# Patient Record
Sex: MALE | Race: WHITE | Employment: OTHER | URBAN - METROPOLITAN AREA
[De-identification: names, ages, dates, MRNs, and addresses within clinical notes are randomized per-mention and may not be internally consistent; named-entity substitution may affect disease eponyms.]

---

## 2019-09-23 ENCOUNTER — HOSPITAL ENCOUNTER (OUTPATIENT)
Dept: CT IMAGING | Age: 69
Discharge: HOME | End: 2019-09-23
Attending: INTERNAL MEDICINE
Payer: MEDICARE

## 2019-09-23 VITALS
HEART RATE: 60 BPM | WEIGHT: 185 LBS | OXYGEN SATURATION: 97 % | BODY MASS INDEX: 27.4 KG/M2 | SYSTOLIC BLOOD PRESSURE: 131 MMHG | DIASTOLIC BLOOD PRESSURE: 86 MMHG | RESPIRATION RATE: 18 BRPM | HEIGHT: 69 IN | TEMPERATURE: 98.1 F

## 2019-09-23 DIAGNOSIS — D58.2 OTHER HEMOGLOBINOPATHIES (HCC): ICD-10-CM

## 2019-09-23 DIAGNOSIS — D75.1 SECONDARY POLYCYTHEMIA: ICD-10-CM

## 2019-09-23 LAB
BASOPHILS # BLD: 0.1 K/UL (ref 0–0.1)
BASOPHILS NFR BLD: 1 % (ref 0–1)
DIFFERENTIAL METHOD BLD: ABNORMAL
EOSINOPHIL # BLD: 0.2 K/UL (ref 0–0.4)
EOSINOPHIL NFR BLD: 2 % (ref 0–7)
ERYTHROCYTE [DISTWIDTH] IN BLOOD BY AUTOMATED COUNT: 12.7 % (ref 11.5–14.5)
HCT VFR BLD AUTO: 52.4 % (ref 36.6–50.3)
HGB BLD-MCNC: 18.3 G/DL (ref 12.1–17)
IMM GRANULOCYTES # BLD AUTO: 0.1 K/UL (ref 0–0.04)
IMM GRANULOCYTES NFR BLD AUTO: 1 % (ref 0–0.5)
LYMPHOCYTES # BLD: 2.6 K/UL (ref 0.8–3.5)
LYMPHOCYTES NFR BLD: 32 % (ref 12–49)
MCH RBC QN AUTO: 33.2 PG (ref 26–34)
MCHC RBC AUTO-ENTMCNC: 34.9 G/DL (ref 30–36.5)
MCV RBC AUTO: 95.1 FL (ref 80–99)
MONOCYTES # BLD: 0.9 K/UL (ref 0–1)
MONOCYTES NFR BLD: 11 % (ref 5–13)
NEUTS SEG # BLD: 4.3 K/UL (ref 1.8–8)
NEUTS SEG NFR BLD: 53 % (ref 32–75)
NRBC # BLD: 0 K/UL (ref 0–0.01)
NRBC BLD-RTO: 0 PER 100 WBC
PLATELET # BLD AUTO: 167 K/UL (ref 150–400)
PMV BLD AUTO: 9.8 FL (ref 8.9–12.9)
RBC # BLD AUTO: 5.51 M/UL (ref 4.1–5.7)
WBC # BLD AUTO: 8 K/UL (ref 4.1–11.1)

## 2019-09-23 PROCEDURE — 85025 COMPLETE CBC W/AUTO DIFF WBC: CPT

## 2019-09-23 PROCEDURE — 77030018781

## 2019-09-23 PROCEDURE — 88313 SPECIAL STAINS GROUP 2: CPT

## 2019-09-23 PROCEDURE — 88184 FLOWCYTOMETRY/ TC 1 MARKER: CPT

## 2019-09-23 PROCEDURE — 74011250636 HC RX REV CODE- 250/636: Performed by: RADIOLOGY

## 2019-09-23 PROCEDURE — 38221 DX BONE MARROW BIOPSIES: CPT

## 2019-09-23 PROCEDURE — 77030003666 HC NDL SPINAL BD -A

## 2019-09-23 PROCEDURE — 88185 FLOWCYTOMETRY/TC ADD-ON: CPT

## 2019-09-23 PROCEDURE — 88311 DECALCIFY TISSUE: CPT

## 2019-09-23 PROCEDURE — 77030028872 HC BN BIOP NDL ON CNTRL TY TELE -C

## 2019-09-23 PROCEDURE — 77030014115

## 2019-09-23 PROCEDURE — 36415 COLL VENOUS BLD VENIPUNCTURE: CPT

## 2019-09-23 PROCEDURE — 88305 TISSUE EXAM BY PATHOLOGIST: CPT

## 2019-09-23 RX ORDER — MIDAZOLAM HYDROCHLORIDE 1 MG/ML
5 INJECTION, SOLUTION INTRAMUSCULAR; INTRAVENOUS
Status: DISCONTINUED | OUTPATIENT
Start: 2019-09-23 | End: 2019-09-27 | Stop reason: HOSPADM

## 2019-09-23 RX ORDER — SODIUM CHLORIDE 9 MG/ML
25 INJECTION, SOLUTION INTRAVENOUS CONTINUOUS
Status: DISCONTINUED | OUTPATIENT
Start: 2019-09-23 | End: 2019-09-23

## 2019-09-23 RX ORDER — HYDROXYZINE 25 MG/1
25 TABLET, FILM COATED ORAL
COMMUNITY

## 2019-09-23 RX ORDER — FENTANYL CITRATE 50 UG/ML
100 INJECTION, SOLUTION INTRAMUSCULAR; INTRAVENOUS
Status: DISCONTINUED | OUTPATIENT
Start: 2019-09-23 | End: 2019-09-23

## 2019-09-23 RX ADMIN — SODIUM CHLORIDE 25 ML/HR: 900 INJECTION, SOLUTION INTRAVENOUS at 08:57

## 2019-09-23 RX ADMIN — MIDAZOLAM 1 MG: 1 INJECTION INTRAMUSCULAR; INTRAVENOUS at 09:00

## 2019-09-23 RX ADMIN — FENTANYL CITRATE 50 MCG: 50 INJECTION, SOLUTION INTRAMUSCULAR; INTRAVENOUS at 09:00

## 2019-09-23 RX ADMIN — FENTANYL CITRATE 25 MCG: 50 INJECTION, SOLUTION INTRAMUSCULAR; INTRAVENOUS at 08:57

## 2019-09-23 RX ADMIN — MIDAZOLAM 2 MG: 1 INJECTION INTRAMUSCULAR; INTRAVENOUS at 08:57

## 2019-09-23 NOTE — ROUTINE PROCESS
Dr. Debby Baker into speak with pt and procedure explained along with risks and benefits; consent obtained for CT guided bone marrow biopsy.

## 2019-09-23 NOTE — H&P
Radiology History and Physical    Patient: Lisset Butler 78 y.o. male       Chief Complaint: No chief complaint on file. History of Present Illness: Hemoglobinopathy, secondary polycythemia.     History:    Past Medical History:   Diagnosis Date    Cancer ([facility] [address]     Skin    Gastric ulcer 11/6/2014    Gastrointestinal disorder     Diverticulosis    GERD (gastroesophageal reflux disease)     Hypertension     Ill-defined condition     Internal hemorrhoids    Personal history of colonic polyps 8/13/2015    Psychiatric disorder     anxiety     Family History   Problem Relation Age of Onset    Hypertension Sister     Diabetes Sister     Diabetes Brother     Hypertension Brother      Social History     Socioeconomic History    Marital status:      Spouse name: Not on file    Number of children: Not on file    Years of education: Not on file    Highest education level: Not on file   Occupational History    Not on file   Social Needs    Financial resource strain: Not on file    Food insecurity:     Worry: Not on file     Inability: Not on file    Transportation needs:     Medical: Not on file     Non-medical: Not on file   Tobacco Use    Smoking status: Never Smoker    Smokeless tobacco: Never Used   Substance and Sexual Activity    Alcohol use: No    Drug use: No    Sexual activity: Not on file   Lifestyle    Physical activity:     Days per week: Not on file     Minutes per session: Not on file    Stress: Not on file   Relationships    Social connections:     Talks on phone: Not on file     Gets together: Not on file     Attends Confucianism service: Not on file     Active member of club or organization: Not on file     Attends meetings of clubs or organizations: Not on file     Relationship status: Not on file    Intimate partner violence:     Fear of current or ex partner: Not on file     Emotionally abused: Not on file     Physically abused: Not on file     Forced sexual activity: Not on file   Other Topics Concern    Not on file   Social History Narrative    Not on file       Allergies: Allergies   Allergen Reactions    Clindamycin Nausea Only    Metronidazole Rash    Pcn [Penicillins] Rash    Tetracycline Rash       Current Medications:  Current Outpatient Medications   Medication Sig    hydrOXYzine HCl (ATARAX) 25 mg tablet Take 25 mg by mouth three (3) times daily as needed for Itching.  RABEprazole (ACIPHEX) 20 mg tablet Take 20 mg by mouth daily.  valsartan-hydrochlorothiazide (DIOVAN-HCT) 160-12.5 mg per tablet Take 1 Tab by mouth daily.  lovastatin (MEVACOR) 40 mg tablet Take 40 mg by mouth daily.  fluticasone (FLOVENT DISKUS) 50 mcg/actuation inhaler Take 1 Puff by inhalation.  aspirin 81 mg chewable tablet Take 81 mg by mouth daily.  fexofenadine (ALLEGRA) 180 mg tablet Take 180 mg by mouth daily.  BETA-CAROTENE,A, W-C & E/MIN (VISION PO) Take  by mouth.  hydrocortisone (PROCTOSOL HC) 2.5 % rectal cream Insert  into rectum as needed for Hemorrhoids. Current Facility-Administered Medications   Medication Dose Route Frequency    midazolam (VERSED) injection 5 mg  5 mg IntraVENous Multiple    0.9% sodium chloride infusion  25 mL/hr IntraVENous CONTINUOUS    fentaNYL citrate (PF) injection 100 mcg  100 mcg IntraVENous Multiple        Physical Exam:  Blood pressure 146/88, pulse 66, temperature 98.1 °F (36.7 °C), resp. rate 20, height 5' 9\" (1.753 m), weight 83.9 kg (185 lb), SpO2 96 %. GENERAL: alert, cooperative, no distress, appears stated age, LUNG: clear to auscultation bilaterally, HEART: regular rate and rhythm      Alerts:    Hospital Problems  Date Reviewed: 4/4/2016    None          Laboratory:      Recent Labs     09/23/19  0814   HGB 18.3*   HCT 52.4*   WBC 8.0            Plan of Care/Planned Procedure:  Risks, benefits, and alternatives reviewed with patient and he agrees to proceed with the procedure.        Martha Souza MD

## 2019-09-23 NOTE — DISCHARGE INSTRUCTIONS
[facility]  Radiology Department  [phone]    Radiologist: Dr. Verito Warren    Date: 09/23/2019         Bone Marrow Biopsy Discharge Instructions      Go home and rest  and restrict your activity the next 24 hours. You have been given sedating medications, so do not drive or drink alcohol today. Resume your previous diet and medications. You may shower in 24 hours. Do not soak or swim until the biopsy site has healed completely to minimize any risk of infection. Watch site for redness, drainage, pus, foul odor, increasing pain and fevers. Should this occur call you doctor immediatly. You may take Tylenol, as directed on the label, for pain or discomfort. Avoid Ibuprofen (Advil, Motrin etc.) and Aspirin today as they may increase your risk of bleeding. Be sure to follow up with your physician, and let him know how you are progressing. Your results will be sent to your physician as soon as they become available.       I

## 2019-09-23 NOTE — PROCEDURES
Interventional Radiology        9/23/2019    Informed consent obtained    Diagnosis: Other hemoglobinopathy, secondary polycythemia. Procedure(s): CT guided bone marrow aspirate and core biopsy. Specimens removed:  5 mL bone marrow aspirate and 2 x 1.2 cm core biopsy. Complications: None    Primary Physician: Joy Knapp MD    Recomendations: none    Discharge Disposition: Radiology recovery, then home if no complication.     Full dictated report to follow    Signed By: Joy Knapp MD

## 2021-10-20 RX ORDER — LORATADINE 10 MG/1
10 TABLET ORAL DAILY
COMMUNITY

## 2021-10-20 NOTE — PERIOP NOTES
[facility]  Ambulatory Surgery Unit  Pre-operative Instructions    Surgery/Procedure Date  10/29            Tentative Arrival Time tbd      1. On the day of your surgery/procedure, please report to the Ambulatory Surgery Unit Registration Desk and sign in at your designated time. The Ambulatory Surgery Unit is located in [facility] on the [facility] side of the [facility] across from the [address] [place] [address]. Please have all of your health insurance cards and a photo ID. 2. You must have someone with you to drive you home, as you should not drive a car for 24 hours following anesthesia. Please make arrangements for a responsible adult friend or family member to stay with you for at least the first 24 hours after your surgery. 3. Do not have anything to eat or drink (including water, gum, mints, coffee, juice) after 11:59 PM  10/28. This may not apply to medications prescribed by your physician. (Please note below the special instructions with medications to take the morning of surgery, if applicable.)    4. We recommend you do not drink any alcoholic beverages for 24 hours before and after your surgery. 5. Contact your surgeons office for instructions on the following medications: non-steroidal anti-inflammatory drugs (i.e. Advil, Aleve), vitamins, and supplements. (Some surgeons will want you to stop these medications prior to surgery and others may allow you to take them)   **If you are currently taking Plavix, Coumadin, Aspirin and/or other blood-thinning agents, contact your surgeon for instructions. ** Your surgeon will partner with the physician prescribing these medications to determine if it is safe to stop or if you need to continue taking. Please do not stop taking these medications without instructions from your surgeon.     6. In an effort to help prevent surgical site infection, we ask that you shower with an anti-bacterial soap (i.e. Dial/Safeguard, or the soap provided to you at your preadmission testing appointment) for 3 days prior to and on the morning of surgery, using a fresh towel after each shower. (Please begin this process with fresh bed linens.) Do not apply any lotions, powders, or deodorants after the shower on the day of your procedure. If applicable, please do not shave the operative site for 48 hours prior to surgery. 7. Wear comfortable clothes. Wear glasses instead of contacts. Do not bring any jewelry or money (other than copays or fees as instructed). Do not wear make-up, particularly mascara, the morning of your surgery. Do not wear nail polish, particularly if you are having foot /hand surgery. Wear your hair loose or down, no ponytails, buns, yi pins or clips. All body piercings must be removed. 8. You should understand that if you do not follow these instructions your surgery may be cancelled. If your physical condition changes (i.e. fever, cold or flu) please contact your surgeon as soon as possible. 9. It is important that you be on time. If a situation occurs where you may be late, or if you have any questions or problems, please call [phone].    10. Your surgery time may be subject to change. You will receive a phone call the day prior to surgery to confirm your arrival time. 11. Pediatric patients: please bring a change of clothes, diapers, bottle/sippy cup, pacifier, etc.      Special Instructions: Take all medications and inhalers, as prescribed, on the morning of surgery with a sip of water EXCEPT: none      Insulin Dependent Diabetic patients: Take your diabetic medications as prescribed the day before surgery. Hold all diabetic medications the day of surgery. If you are scheduled to arrive for surgery after 8:00 AM, and your AM blood sugar is >200, please call Ambulatory Surgery. I understand a pre-operative phone call will be made to verify my surgery time.   In the event that I am not available, I give permission for a message to be left on my answering service and/or with another person? yes      Reviewed by phone with pt, verbalized understanding.   covid testing 10/25 [id number]   ___________________      ___________________      ________________  (Signature of Patient)          (Witness)                   (Date and Time)

## 2021-10-25 ENCOUNTER — HOSPITAL ENCOUNTER (OUTPATIENT)
Dept: PREADMISSION TESTING | Age: 71
Discharge: HOME | End: 2021-10-25
Payer: MEDICARE

## 2021-10-25 PROCEDURE — U0005 INFEC AGEN DETEC AMPLI PROBE: HCPCS

## 2021-10-26 LAB
SARS-COV-2, XPLCVT: NOT DETECTED
SOURCE, COVRS: NORMAL

## 2021-10-28 ENCOUNTER — ANESTHESIA EVENT (OUTPATIENT)
Dept: SURGERY | Age: 71
End: 2021-10-28
Payer: MEDICARE

## 2021-10-29 ENCOUNTER — ANESTHESIA (OUTPATIENT)
Dept: SURGERY | Age: 71
End: 2021-10-29
Payer: MEDICARE

## 2021-10-29 ENCOUNTER — HOSPITAL ENCOUNTER (OUTPATIENT)
Age: 71
Setting detail: OUTPATIENT SURGERY
Discharge: HOME | End: 2021-10-29
Attending: SPECIALIST | Admitting: SPECIALIST
Payer: MEDICARE

## 2021-10-29 VITALS
HEIGHT: 68 IN | RESPIRATION RATE: 10 BRPM | HEART RATE: 60 BPM | SYSTOLIC BLOOD PRESSURE: 125 MMHG | TEMPERATURE: 97.8 F | OXYGEN SATURATION: 96 % | BODY MASS INDEX: 26.98 KG/M2 | WEIGHT: 178 LBS | DIASTOLIC BLOOD PRESSURE: 82 MMHG

## 2021-10-29 PROCEDURE — 74011000250 HC RX REV CODE- 250: Performed by: NURSE ANESTHETIST, CERTIFIED REGISTERED

## 2021-10-29 PROCEDURE — 74011250637 HC RX REV CODE- 250/637: Performed by: SPECIALIST

## 2021-10-29 PROCEDURE — 77030021352 HC CBL LD SYS DISP COVD -B: Performed by: SPECIALIST

## 2021-10-29 PROCEDURE — 76210000050 HC AMBSU PH II REC 0.5 TO 1 HR: Performed by: SPECIALIST

## 2021-10-29 PROCEDURE — 2709999900 HC NON-CHARGEABLE SUPPLY: Performed by: SPECIALIST

## 2021-10-29 PROCEDURE — 74011250636 HC RX REV CODE- 250/636: Performed by: ANESTHESIOLOGY

## 2021-10-29 PROCEDURE — 76060000073 HC AMB SURG ANES FIRST 0.5 HR: Performed by: SPECIALIST

## 2021-10-29 PROCEDURE — 76030000002 HC AMB SURG OR TIME FIRST 0.: Performed by: SPECIALIST

## 2021-10-29 PROCEDURE — 74011250636 HC RX REV CODE- 250/636: Performed by: NURSE ANESTHETIST, CERTIFIED REGISTERED

## 2021-10-29 RX ORDER — SODIUM CHLORIDE 0.9 % (FLUSH) 0.9 %
5-40 SYRINGE (ML) INJECTION EVERY 8 HOURS
Status: DISCONTINUED | OUTPATIENT
Start: 2021-10-29 | End: 2021-10-29 | Stop reason: HOSPADM

## 2021-10-29 RX ORDER — PHENOL/SODIUM PHENOLATE
20 AEROSOL, SPRAY (ML) MUCOUS MEMBRANE DAILY
COMMUNITY

## 2021-10-29 RX ORDER — SODIUM CHLORIDE 0.9 % (FLUSH) 0.9 %
5-40 SYRINGE (ML) INJECTION AS NEEDED
Status: DISCONTINUED | OUTPATIENT
Start: 2021-10-29 | End: 2021-10-29 | Stop reason: HOSPADM

## 2021-10-29 RX ORDER — ONDANSETRON 2 MG/ML
4 INJECTION INTRAMUSCULAR; INTRAVENOUS AS NEEDED
Status: DISCONTINUED | OUTPATIENT
Start: 2021-10-29 | End: 2021-10-29 | Stop reason: HOSPADM

## 2021-10-29 RX ORDER — DIPHENHYDRAMINE HYDROCHLORIDE 50 MG/ML
12.5 INJECTION, SOLUTION INTRAMUSCULAR; INTRAVENOUS AS NEEDED
Status: DISCONTINUED | OUTPATIENT
Start: 2021-10-29 | End: 2021-10-29 | Stop reason: HOSPADM

## 2021-10-29 RX ORDER — SODIUM CHLORIDE, SODIUM LACTATE, POTASSIUM CHLORIDE, CALCIUM CHLORIDE 600; 310; 30; 20 MG/100ML; MG/100ML; MG/100ML; MG/100ML
25 INJECTION, SOLUTION INTRAVENOUS CONTINUOUS
Status: DISCONTINUED | OUTPATIENT
Start: 2021-10-29 | End: 2021-10-29 | Stop reason: HOSPADM

## 2021-10-29 RX ORDER — PROPOFOL 10 MG/ML
INJECTION, EMULSION INTRAVENOUS AS NEEDED
Status: DISCONTINUED | OUTPATIENT
Start: 2021-10-29 | End: 2021-10-29 | Stop reason: HOSPADM

## 2021-10-29 RX ORDER — LORAZEPAM 1 MG/1
0.5 TABLET ORAL
COMMUNITY

## 2021-10-29 RX ORDER — LIDOCAINE HYDROCHLORIDE 20 MG/ML
INJECTION, SOLUTION EPIDURAL; INFILTRATION; INTRACAUDAL; PERINEURAL AS NEEDED
Status: DISCONTINUED | OUTPATIENT
Start: 2021-10-29 | End: 2021-10-29 | Stop reason: HOSPADM

## 2021-10-29 RX ORDER — FENTANYL CITRATE 50 UG/ML
25 INJECTION, SOLUTION INTRAMUSCULAR; INTRAVENOUS
Status: DISCONTINUED | OUTPATIENT
Start: 2021-10-29 | End: 2021-10-29 | Stop reason: HOSPADM

## 2021-10-29 RX ORDER — DEXTROMETHORPHAN/PSEUDOEPHED 2.5-7.5/.8
DROPS ORAL AS NEEDED
Status: DISCONTINUED | OUTPATIENT
Start: 2021-10-29 | End: 2021-10-29 | Stop reason: HOSPADM

## 2021-10-29 RX ORDER — LIDOCAINE HYDROCHLORIDE 10 MG/ML
0.1 INJECTION, SOLUTION EPIDURAL; INFILTRATION; INTRACAUDAL; PERINEURAL AS NEEDED
Status: DISCONTINUED | OUTPATIENT
Start: 2021-10-29 | End: 2021-10-29 | Stop reason: HOSPADM

## 2021-10-29 RX ADMIN — PROPOFOL 50 MG: 10 INJECTION, EMULSION INTRAVENOUS at 07:32

## 2021-10-29 RX ADMIN — PROPOFOL 50 MG: 10 INJECTION, EMULSION INTRAVENOUS at 07:39

## 2021-10-29 RX ADMIN — LIDOCAINE HYDROCHLORIDE 40 MG: 20 INJECTION, SOLUTION EPIDURAL; INFILTRATION; INTRACAUDAL; PERINEURAL at 07:32

## 2021-10-29 RX ADMIN — PROPOFOL 20 MG: 10 INJECTION, EMULSION INTRAVENOUS at 07:42

## 2021-10-29 RX ADMIN — SODIUM CHLORIDE, POTASSIUM CHLORIDE, SODIUM LACTATE AND CALCIUM CHLORIDE 25 ML/HR: 600; 310; 30; 20 INJECTION, SOLUTION INTRAVENOUS at 06:41

## 2021-10-29 RX ADMIN — PROPOFOL 50 MG: 10 INJECTION, EMULSION INTRAVENOUS at 07:35

## 2021-10-29 NOTE — PERIOP NOTES
Pt tolerating liquids, vss.    Dc instructions reviewed with pt and wife, all questions answered. Reviewed when to call the doctor, diet, and activity. Pt denies any pain. 0830-Transported via w/c to awaiting transportation.

## 2021-10-29 NOTE — PERIOP NOTES
Permission received to review discharge instructions and discuss private health information with wife, Khai stein. Patient states that wife will be with them for at least 24 hours following today's procedure.

## 2021-10-29 NOTE — H&P
Pre-endoscopy H and P    The patient was seen and examined in the [facility] pre op. The airway was assessed and docuemented. The problem list, past medical history, and medications were reviewed. Patient Active Problem List   Diagnosis Code    GIB (gastrointestinal bleeding) K92.2    Gastric ulcer K25.9    History of colon polyps Z86.010    Left inguinal hernia K40.90     Social History     Socioeconomic History    Marital status:      Spouse name: Not on file    Number of children: Not on file    Years of education: Not on file    Highest education level: Not on file   Occupational History    Not on file   Tobacco Use    Smoking status: Never Smoker    Smokeless tobacco: Never Used   Substance and Sexual Activity    Alcohol use: No    Drug use: No    Sexual activity: Not on file   Other Topics Concern    Not on file   Social History Narrative    Not on file     Social Determinants of Health     Financial Resource Strain:     Difficulty of Paying Living Expenses:    Food Insecurity:     Worried About Running Out of Food in the Last Year:     Ran Out of Food in the Last Year:    Transportation Needs:     Lack of Transportation (Medical):      Lack of Transportation (Non-Medical):    Physical Activity:     Days of Exercise per Week:     Minutes of Exercise per Session:    Stress:     Feeling of Stress :    Social Connections:     Frequency of Communication with Friends and Family:     Frequency of Social Gatherings with Friends and Family:     Attends Taoist Services:     Active Member of Clubs or Organizations:     Attends Club or Organization Meetings:     Marital Status:    Intimate Partner Violence:     Fear of Current or Ex-Partner:     Emotionally Abused:     Physically Abused:     Sexually Abused:      Past Medical History:   Diagnosis Date    Gastric ulcer 11/6/2014    Gastrointestinal disorder     Diverticulosis    GERD (gastroesophageal reflux disease)     Hypertension     Ill-defined condition     Internal hemorrhoids    Nausea & vomiting     Personal history of colonic polyps 8/13/2015    Polycythemia     Dr. Jessica Ramirez monitoring. no treatment.  Psychiatric disorder     anxiety    Skin cancer      The patient has a family history of na    Prior to Admission Medications   Prescriptions Last Dose Informant Patient Reported? Taking? BETA-CAROTENE,A, W-C & E/MIN (VISION PO) 10/28/2021 at Unknown time  Yes Yes   Sig: Take  by mouth. LORazepam (Ativan) 1 mg tablet 10/29/2021 at Unknown time  Yes Yes   Sig: Take 0.5 mg by mouth every four (4) hours as needed for Anxiety. Omeprazole delayed release (PRILOSEC D/R) 20 mg tablet 10/28/2021 at Unknown time  Yes Yes   Sig: Take 20 mg by mouth daily. RABEprazole (ACIPHEX) 20 mg tablet Not Taking at Unknown time  Yes No   Sig: Take 20 mg by mouth daily. Patient not taking: Reported on 10/29/2021   hydrOXYzine HCl (ATARAX) 25 mg tablet Unknown at Unknown time  Yes No   Sig: Take 25 mg by mouth three (3) times daily as needed for Itching.   hydrocortisone (PROCTOSOL HC) 2.5 % rectal cream Not Taking at Unknown time  Yes No   Sig: Insert  into rectum as needed for Hemorrhoids. Patient not taking: Reported on 10/20/2021   loratadine (Claritin) 10 mg tablet 10/28/2021 at Unknown time  Yes Yes   Sig: Take 10 mg by mouth daily. lovastatin (MEVACOR) 40 mg tablet 10/29/2021 at Unknown time  Yes Yes   Sig: Take 40 mg by mouth daily. valsartan-hydrochlorothiazide (DIOVAN-HCT) 160-12.5 mg per tablet 10/29/2021 at Unknown time  Yes Yes   Sig: Take 1 Tab by mouth daily. Facility-Administered Medications: None           The review of systems is:  negative for shortness of breath or chest pain      The heart, lungs, and mental status were satisfactory for the administration of anesthesia sedation and for the procedure.       I discussed with the patient the objectives, risks, consequences and alternatives to the procedure. The patient was counseled at length about the risks of cheryl Covid-19 during their perioperative period and any recovery window from their procedure. The patient was made aware that cheryl Covid-19  may worsen their prognosis for recovering from their procedure and lend to a higher morbidity and/or mortality risk. All material risks, benefits, and reasonable alternatives including postponing the procedure were discussed. The patient does  wish to proceed with the procedure at this time.         Isabel Elizabeth MD  10/29/2021  7:06 AM

## 2021-10-29 NOTE — PERIOP NOTES
Priyank Garg  1950  [id number]    Situation:  Verbal report given from: OTIS Moya CRNA, RN  Procedure: Procedure(s):  COLONOSCOPY    Background:    Preoperative diagnosis: HX COLON POLYPS    Postoperative diagnosis: Diverticulosis, internal hemorrhoid    :  Dr. Chiqui Junior    Assistant(s): Circ-1: Lonnie Ochoa  Circ-2: Wally Quintana RN  Scrub Tech-1: Gemma Pacheco    Specimens: * No specimens in log *    Assessment:  Intra-procedure medications         Anesthesia gave intra-procedure sedation and medications, see anesthesia flow sheet     Intravenous fluids: LR@ KVO     Vital signs stable       Recommendation:

## 2021-10-29 NOTE — DISCHARGE INSTRUCTIONS
Charanjit Stager  [id number]  1950              Procedure  Discharge Instructions:      Discomfort:  Redness at IV site- apply cold compress to area; if redness or soreness persist- contact your physician  There may be a slight amount of blood passed from the rectum  Gaseous discomfort- walking, belching will help relieve any discomfort  You may not operate a vehicle for 12 hours  You may not engage in an occupation involving machinery or appliances for rest of today  You may not drink alcoholic beverages for at least 12 hours  Avoid making any critical decisions for at least 24 hour  DIET:   You may resume your normal diet today. You should not overeat or \"feast\" today as your abdomen may become distended or uncomfortable. MEDICATIONS:   I reconciled this list from the list you gave us when you came today for the procedure. Please clarify with me, your primary care physician and the nurse who is discharging you if we have any discrepancies. Aspirin and or non-steroidal medication (Ibuprofen, Motrin, naproxen, etc.) is ok in limited quantities. ACTIVITY:  You may resume your normal daily activities it is recommended that you spend the remainder of the day resting -  avoid any strenuous activity. CALL M.D. ANY SIGN OF:  Increasing pain, nausea, vomiting  Abdominal distension (swelling)  New increased bleeding (oral or rectal)  Fever (chills)  Pain in chest area  Bloody discharge from nose or mouth  Shortness of breath          Follow-up Instructions:   No biopsies  Telephone #  [phone]  Follow up visit as needed. You and your pcp can determine if you need another colonoscopy in ten years. You only need omeprazole or rabeprazole, not both     Isabel Elizabeth MD  7:56 AM  10/29/2021           DO NOT TAKE SLEEPING MEDICATIONS OR ANTIANXIETY MEDICATIONS WHILE TAKING NARCOTIC PAIN MEDICATIONS,  ESPECIALLY THE NIGHT OF ANESTHESIA.     CPAP PATIENTS BE SURE TO WEAR MACHINE WHENEVER NAPPING OR SLEEPING. DISCHARGE SUMMARY from Nurse    The following personal items collected during your admission are returned to you:   Dental Appliance: Dental Appliances: None  Vision: Visual Aid: Glasses (Glasses given to wife)  Hearing Aid:    Jewelry:    Clothing:    Other Valuables:    Valuables sent to safe:        PATIENT INSTRUCTIONS:    Anesthesia Discharge Instructions for Procedural Area requiring Sedation (MAC Anesthesia, Cath Lab, Endo and Radiology): You have been given medications during your procedure that may affect your memory and mental judgement for the next 24 hours. During this time frame for your safety, please follow the instructions listed below :    Have a responsible adult to drive you home and be with you for at least 12 hours.  Rest today and resume normal activities tomorrow.  Start with a soft bland diet and advance as tolerated to your recommended diet.  Do not drive any motor vehicle or operate mechanical or electrical equipment prior to [employer].  Avoid making critical decisions or signing legal documents prior to 6am tomorrow.  Do not drink alcohol prior to 6am tomorrow.  If you have sleep apnea and you plan to go home and take a nap, please use your CPAP machine not only at bedtime, but also while napping for 24 hours.  If you notice any redness or swelling on parts of your body where IV medications were given, place a warm wet washcloth over the area for 20 minutes at a time until the redness or swelling goes away. If you still have redness or swelling after 2-3 days, please call us. · You will receive a Post Operative Call from one of the Recovery Room Nurses on the day after your surgery to check on you. It is very important for us to know how you are recovering after your surgery. If you have an issue or need to speak with someone, please call your surgeon, do not wait for the post operative call.     · You may receive an e-mail or letter in the mail from [employer] regarding your experience with us in the Ambulatory Surgery Unit. Your feedback is valuable to us and we appreciate your participation in the survey. · We wish you a speedy recovery ? What to do at Home:      *  Please give a list of your current medications to your Primary Care Provider. *  Please update this list whenever your medications are discontinued, doses are      changed, or new medications (including over-the-counter products) are added. *  Please carry medication information at all times in case of emergency situations. If you have not received your influenza and/or pneumococcal vaccine, please follow up with your primary care physician. The discharge information has been reviewed with the patient and caregiver. The patient and caregiver verbalized understanding.

## 2021-10-29 NOTE — ANESTHESIA PREPROCEDURE EVALUATION
Anesthetic History     PONV          Review of Systems / Medical History  Patient summary reviewed, nursing notes reviewed and pertinent labs reviewed    Pulmonary  Within defined limits                 Neuro/Psych   Within defined limits      Psychiatric history     Cardiovascular    Hypertension              Exercise tolerance: >4 METS     GI/Hepatic/Renal     GERD      PUD    Comments: Diverticulosis  H/o colon polyps Endo/Other        Cancer     Other Findings   Comments:     Polycythemia; monitored by Hematologist                Physical Exam    Airway  Mallampati: III  TM Distance: 4 - 6 cm  Neck ROM: normal range of motion   Mouth opening: Normal     Cardiovascular    Rhythm: regular  Rate: normal         Dental  No notable dental hx       Pulmonary  Breath sounds clear to auscultation               Abdominal  GI exam deferred       Other Findings            Anesthetic Plan    ASA: 2  Anesthesia type: general and total IV anesthesia          Induction: Intravenous  Anesthetic plan and risks discussed with: Patient      PONV prophylaxis

## 2021-10-29 NOTE — ANESTHESIA POSTPROCEDURE EVALUATION
Procedure(s):  COLONOSCOPY. general, total IV anesthesia    Anesthesia Post Evaluation      Multimodal analgesia: multimodal analgesia not used between 6 hours prior to anesthesia start to PACU discharge  Patient location during evaluation: PACU  Patient participation: complete - patient participated  Level of consciousness: awake and alert  Pain score: 0  Airway patency: patent  Anesthetic complications: no  Cardiovascular status: acceptable  Respiratory status: acceptable  Hydration status: acceptable  Post anesthesia nausea and vomiting:  none  Final Post Anesthesia Temperature Assessment:  Normothermia (36.0-37.5 degrees C)      INITIAL Post-op Vital signs:   Vitals Value Taken Time   /82 10/29/21 0814   Temp 36.6 °C (97.8 °F) 10/29/21 0808   Pulse 61 10/29/21 0816   Resp 22 10/29/21 0816   SpO2 95 % 10/29/21 0816   Vitals shown include unvalidated device data.

## 2021-10-29 NOTE — PROCEDURES
Colonoscopy    Indications: history colon polyps    Pre-operative Diagnosis: history colon polyps    Assistant(s):  see chart   Rikc Varela RN  [facility]    Medications:  See anesthesia form    Post-operative Diagnosis:  Diverticulosis, internal hemorrhoid            Procedure Details   Prior to the procedure its objectives, risks, consequences and alternatives were discussed with the patient who then elected to proceed. All questions were answered. Digital Rectal Exam:  was normal     The Olympus videocolonoscope was inserted in the rectum and advanced to the cecum. The cecum was identified by typical landmarks. The colonoscope was slowly and carefully withdrawn as the mucosa was inspected. Left sided diverticula were seen. No other abnormalities were noted. Retroflexion in the rectum was remarkable for internal hemorrhoids. Photos to document the ileocecal valve, appendiceal orifice and retroflexion exam were obtained. The preparation was adequate      Estimated Blood Loss:  none    Specimens:  none    Findings:  Negative exam except internal hemorrhoids and diverticula    Complications:  none    Implants:  none    Repeat colonoscopy is recommended in:  (PCP and patient to consider in ten years.                Shanda Charles MD  7:49 AM  10/29/2021

## 2023-05-22 RX ORDER — VALSARTAN AND HYDROCHLOROTHIAZIDE 160; 12.5 MG/1; MG/1
1 TABLET, FILM COATED ORAL DAILY
COMMUNITY

## 2023-05-22 RX ORDER — RABEPRAZOLE SODIUM 20 MG/1
20 TABLET, DELAYED RELEASE ORAL DAILY
COMMUNITY

## 2023-05-22 RX ORDER — HYDROXYZINE HYDROCHLORIDE 25 MG/1
25 TABLET, FILM COATED ORAL 3 TIMES DAILY PRN
COMMUNITY

## 2023-05-22 RX ORDER — LORATADINE 10 MG/1
10 TABLET ORAL DAILY
COMMUNITY

## 2023-05-22 RX ORDER — LOVASTATIN 40 MG/1
40 TABLET ORAL DAILY
COMMUNITY

## 2023-05-22 RX ORDER — LORAZEPAM 1 MG/1
0.5 TABLET ORAL EVERY 4 HOURS PRN
COMMUNITY

## 2023-05-22 RX ORDER — OMEPRAZOLE 20 MG/1
20 TABLET, DELAYED RELEASE ORAL DAILY
COMMUNITY

## (undated) DEVICE — KIT,1200CC CANISTER,3/16"X6' TUBING: Brand: MEDLINE INDUSTRIES, INC.

## (undated) DEVICE — BASIN EMSIS 16OZ GRAPHITE PLAS KID SHP MOLD GRAD FOR ORAL

## (undated) DEVICE — CONTINU-FLO SOLUTION SET, 2 INJECTION SITES, MALE LUER LOCK ADAPTER WITH RETRACTABLE COLLAR, LARGE BORE STOPCOCK WITH ROTATING MALE LUER LOCK EXTENSION SET, 2 INJECTION SITES, MALE LUER LOCK ADAPTER WITH RETRACTABLE COLLAR: Brand: INTERLINK/CONTINU-FLO

## (undated) DEVICE — ENDO CARRY-ON PROCEDURE KIT INCLUDES ENZYMATIC SPONGE, GAUZE, BIOHAZARD LABEL, TRAY, LUBRICANT, DIRTY SCOPE LABEL, WATER LABEL, TRAY, DRAWSTRING PAD, AND DEFENDO 4-PIECE KIT.: Brand: ENDO CARRY-ON PROCEDURE KIT

## (undated) DEVICE — KENDALL DL ECG CABLE AND LEAD WIRE SYSTEM, 3-LEAD, SINGLE PATIENT USE: Brand: KENDALL

## (undated) DEVICE — 3M™ TEGADERM™ TRANSPARENT FILM DRESSING FRAME STYLE, 1624W, 2-3/8 IN X 2-3/4 IN (6 CM X 7 CM), 100/CT 4CT/CASE: Brand: 3M™ TEGADERM™

## (undated) DEVICE — ELECTRODE,RADIOTRANSLUCENT,FOAM,5PK: Brand: MEDLINE